# Patient Record
Sex: FEMALE | Race: BLACK OR AFRICAN AMERICAN | NOT HISPANIC OR LATINO | Employment: OTHER | URBAN - METROPOLITAN AREA
[De-identification: names, ages, dates, MRNs, and addresses within clinical notes are randomized per-mention and may not be internally consistent; named-entity substitution may affect disease eponyms.]

---

## 2020-10-23 ENCOUNTER — HISTORICAL (OUTPATIENT)
Dept: ADMINISTRATIVE | Facility: HOSPITAL | Age: 80
End: 2020-10-23

## 2020-10-23 LAB
ALBUMIN SERPL-MCNC: 3.8 GM/DL (ref 3.4–5)
ALBUMIN/GLOB SERPL: 1.4 RATIO (ref 1.1–2)
ALP SERPL-CCNC: 82 UNIT/L (ref 40–150)
ALT SERPL-CCNC: 11 UNIT/L (ref 0–55)
AST SERPL-CCNC: 16 UNIT/L (ref 5–34)
BILIRUB SERPL-MCNC: 0.5 MG/DL
BILIRUBIN DIRECT+TOT PNL SERPL-MCNC: 0.2 MG/DL (ref 0–0.5)
BILIRUBIN DIRECT+TOT PNL SERPL-MCNC: 0.3 MG/DL (ref 0–0.8)
BUN SERPL-MCNC: 6.5 MG/DL (ref 9.8–20.1)
CALCIUM SERPL-MCNC: 9.5 MG/DL (ref 8.4–10.2)
CHLORIDE SERPL-SCNC: 107 MMOL/L (ref 98–107)
CHOLEST SERPL-MCNC: 226 MG/DL
CHOLEST/HDLC SERPL: 5 {RATIO} (ref 0–5)
CO2 SERPL-SCNC: 27 MMOL/L (ref 23–31)
CREAT SERPL-MCNC: 0.76 MG/DL (ref 0.55–1.02)
GLOBULIN SER-MCNC: 2.8 GM/DL (ref 2.4–3.5)
GLUCOSE SERPL-MCNC: 83 MG/DL (ref 82–115)
HDLC SERPL-MCNC: 47 MG/DL (ref 35–60)
LDLC SERPL CALC-MCNC: 159 MG/DL (ref 50–140)
POTASSIUM SERPL-SCNC: 4.2 MMOL/L (ref 3.5–5.1)
PROT SERPL-MCNC: 6.6 GM/DL (ref 5.8–7.6)
SODIUM SERPL-SCNC: 142 MMOL/L (ref 136–145)
TRIGL SERPL-MCNC: 101 MG/DL (ref 37–140)
VLDLC SERPL CALC-MCNC: 20 MG/DL

## 2021-03-05 ENCOUNTER — HISTORICAL (OUTPATIENT)
Dept: ADMINISTRATIVE | Facility: HOSPITAL | Age: 81
End: 2021-03-05

## 2021-03-05 LAB
ALBUMIN SERPL-MCNC: 4 GM/DL (ref 3.4–4.8)
ALBUMIN/GLOB SERPL: 1.3 RATIO (ref 1.1–2)
ALP SERPL-CCNC: 113 UNIT/L (ref 40–150)
ALT SERPL-CCNC: 14 UNIT/L (ref 0–55)
AMYLASE SERPL-CCNC: 89 UNIT/L (ref 20–160)
AST SERPL-CCNC: 19 UNIT/L (ref 5–34)
BILIRUB SERPL-MCNC: 0.5 MG/DL
BILIRUBIN DIRECT+TOT PNL SERPL-MCNC: 0.2 MG/DL (ref 0–0.5)
BILIRUBIN DIRECT+TOT PNL SERPL-MCNC: 0.3 MG/DL (ref 0–0.8)
BUN SERPL-MCNC: 9.3 MG/DL (ref 9.8–20.1)
CALCIUM SERPL-MCNC: 10.1 MG/DL (ref 8.4–10.2)
CHLORIDE SERPL-SCNC: 102 MMOL/L (ref 98–107)
CO2 SERPL-SCNC: 29 MMOL/L (ref 23–31)
CREAT SERPL-MCNC: 0.81 MG/DL (ref 0.55–1.02)
ERYTHROCYTE [DISTWIDTH] IN BLOOD BY AUTOMATED COUNT: 13.2 % (ref 11.5–17)
GLOBULIN SER-MCNC: 3.1 GM/DL (ref 2.4–3.5)
GLUCOSE SERPL-MCNC: 86 MG/DL (ref 82–115)
HCT VFR BLD AUTO: 43.5 % (ref 37–47)
HGB BLD-MCNC: 14 GM/DL (ref 12–16)
LIPASE SERPL-CCNC: 29 U/L
MCH RBC QN AUTO: 30.1 PG (ref 27–31)
MCHC RBC AUTO-ENTMCNC: 32.2 GM/DL (ref 33–36)
MCV RBC AUTO: 93.5 FL (ref 80–94)
PLATELET # BLD AUTO: 345 X10(3)/MCL (ref 130–400)
PMV BLD AUTO: 11.5 FL (ref 9.4–12.4)
POTASSIUM SERPL-SCNC: 4.1 MMOL/L (ref 3.5–5.1)
PROT SERPL-MCNC: 7.1 GM/DL (ref 5.8–7.6)
RBC # BLD AUTO: 4.65 X10(6)/MCL (ref 4.2–5.4)
SODIUM SERPL-SCNC: 139 MMOL/L (ref 136–145)
T3FREE SERPL-MCNC: 3.07 PG/ML (ref 1.71–3.71)
T4 SERPL-MCNC: 9.95 UG/DL (ref 4.87–11.72)
TSH SERPL-ACNC: 2.48 UIU/ML (ref 0.35–4.94)
WBC # SPEC AUTO: 5.4 X10(3)/MCL (ref 4.5–11.5)

## 2023-04-06 ENCOUNTER — ANESTHESIA (OUTPATIENT)
Dept: ENDOSCOPY | Facility: HOSPITAL | Age: 83
End: 2023-04-06
Payer: MEDICARE

## 2023-04-06 ENCOUNTER — ANESTHESIA EVENT (OUTPATIENT)
Dept: ENDOSCOPY | Facility: HOSPITAL | Age: 83
End: 2023-04-06
Payer: MEDICARE

## 2023-04-06 ENCOUNTER — HOSPITAL ENCOUNTER (OUTPATIENT)
Facility: HOSPITAL | Age: 83
Discharge: HOME | End: 2023-04-06
Attending: INTERNAL MEDICINE | Admitting: INTERNAL MEDICINE
Payer: MEDICARE

## 2023-04-06 VITALS
OXYGEN SATURATION: 100 % | SYSTOLIC BLOOD PRESSURE: 126 MMHG | HEIGHT: 60 IN | WEIGHT: 120 LBS | RESPIRATION RATE: 16 BRPM | HEART RATE: 71 BPM | BODY MASS INDEX: 23.56 KG/M2 | TEMPERATURE: 97 F | DIASTOLIC BLOOD PRESSURE: 75 MMHG

## 2023-04-06 DIAGNOSIS — R13.10 DYSPHAGIA, UNSPECIFIED TYPE: ICD-10-CM

## 2023-04-06 PROCEDURE — D9220A PRA ANESTHESIA: ICD-10-PCS | Mod: CRNA,,, | Performed by: NURSE ANESTHETIST, CERTIFIED REGISTERED

## 2023-04-06 PROCEDURE — 37000008 HC ANESTHESIA 1ST 15 MINUTES: Performed by: INTERNAL MEDICINE

## 2023-04-06 PROCEDURE — 25000003 PHARM REV CODE 250: Performed by: ANESTHESIOLOGY

## 2023-04-06 PROCEDURE — 88312 SPECIAL STAINS GROUP 1: CPT

## 2023-04-06 PROCEDURE — 25000003 PHARM REV CODE 250: Performed by: NURSE ANESTHETIST, CERTIFIED REGISTERED

## 2023-04-06 PROCEDURE — 43450 DILATE ESOPHAGUS 1/MULT PASS: CPT | Performed by: INTERNAL MEDICINE

## 2023-04-06 PROCEDURE — D9220A PRA ANESTHESIA: ICD-10-PCS | Mod: ANES,,, | Performed by: ANESTHESIOLOGY

## 2023-04-06 PROCEDURE — 43239 EGD BIOPSY SINGLE/MULTIPLE: CPT | Performed by: INTERNAL MEDICINE

## 2023-04-06 PROCEDURE — 27201423 OPTIME MED/SURG SUP & DEVICES STERILE SUPPLY: Performed by: INTERNAL MEDICINE

## 2023-04-06 PROCEDURE — 63600175 PHARM REV CODE 636 W HCPCS: Performed by: NURSE ANESTHETIST, CERTIFIED REGISTERED

## 2023-04-06 PROCEDURE — 37000009 HC ANESTHESIA EA ADD 15 MINS: Performed by: INTERNAL MEDICINE

## 2023-04-06 PROCEDURE — 88305 TISSUE EXAM BY PATHOLOGIST: CPT | Performed by: INTERNAL MEDICINE

## 2023-04-06 PROCEDURE — 88313 SPECIAL STAINS GROUP 2: CPT

## 2023-04-06 PROCEDURE — D9220A PRA ANESTHESIA: Mod: CRNA,,, | Performed by: NURSE ANESTHETIST, CERTIFIED REGISTERED

## 2023-04-06 PROCEDURE — D9220A PRA ANESTHESIA: Mod: ANES,,, | Performed by: ANESTHESIOLOGY

## 2023-04-06 RX ORDER — SODIUM CHLORIDE, SODIUM GLUCONATE, SODIUM ACETATE, POTASSIUM CHLORIDE AND MAGNESIUM CHLORIDE 30; 37; 368; 526; 502 MG/100ML; MG/100ML; MG/100ML; MG/100ML; MG/100ML
INJECTION, SOLUTION INTRAVENOUS ONCE
Status: COMPLETED | OUTPATIENT
Start: 2023-04-06 | End: 2023-04-06

## 2023-04-06 RX ORDER — LISINOPRIL AND HYDROCHLOROTHIAZIDE 12.5; 2 MG/1; MG/1
1 TABLET ORAL DAILY
COMMUNITY

## 2023-04-06 RX ORDER — LIDOCAINE HYDROCHLORIDE 10 MG/ML
1 INJECTION, SOLUTION EPIDURAL; INFILTRATION; INTRACAUDAL; PERINEURAL ONCE
Status: CANCELLED | OUTPATIENT
Start: 2023-04-06 | End: 2023-04-06

## 2023-04-06 RX ORDER — OMEPRAZOLE 40 MG/1
40 CAPSULE, DELAYED RELEASE ORAL DAILY
COMMUNITY

## 2023-04-06 RX ORDER — PROPOFOL 10 MG/ML
VIAL (ML) INTRAVENOUS
Status: COMPLETED
Start: 2023-04-06 | End: 2023-04-06

## 2023-04-06 RX ORDER — FLUOXETINE 10 MG/1
10 CAPSULE ORAL DAILY
COMMUNITY

## 2023-04-06 RX ORDER — LIDOCAINE HYDROCHLORIDE 10 MG/ML
INJECTION, SOLUTION EPIDURAL; INFILTRATION; INTRACAUDAL; PERINEURAL
Status: DISCONTINUED | OUTPATIENT
Start: 2023-04-06 | End: 2023-04-06

## 2023-04-06 RX ORDER — SODIUM CHLORIDE, SODIUM GLUCONATE, SODIUM ACETATE, POTASSIUM CHLORIDE AND MAGNESIUM CHLORIDE 30; 37; 368; 526; 502 MG/100ML; MG/100ML; MG/100ML; MG/100ML; MG/100ML
INJECTION, SOLUTION INTRAVENOUS CONTINUOUS
Status: CANCELLED | OUTPATIENT
Start: 2023-04-06 | End: 2023-05-06

## 2023-04-06 RX ORDER — DIAZEPAM 5 MG/1
2.5 TABLET ORAL 2 TIMES DAILY
COMMUNITY

## 2023-04-06 RX ORDER — ROSUVASTATIN CALCIUM 20 MG/1
20 TABLET, COATED ORAL DAILY
COMMUNITY

## 2023-04-06 RX ORDER — MEMANTINE HYDROCHLORIDE 10 MG/1
5 TABLET ORAL 2 TIMES DAILY
COMMUNITY

## 2023-04-06 RX ORDER — ASPIRIN 81 MG/1
81 TABLET ORAL DAILY
COMMUNITY
End: 2023-10-03 | Stop reason: ALTCHOICE

## 2023-04-06 RX ORDER — PROPOFOL 10 MG/ML
VIAL (ML) INTRAVENOUS
Status: DISCONTINUED | OUTPATIENT
Start: 2023-04-06 | End: 2023-04-06

## 2023-04-06 RX ORDER — SPIRONOLACTONE 50 MG/1
50 TABLET, FILM COATED ORAL DAILY
COMMUNITY

## 2023-04-06 RX ORDER — LIDOCAINE HYDROCHLORIDE 10 MG/ML
INJECTION, SOLUTION EPIDURAL; INFILTRATION; INTRACAUDAL; PERINEURAL
Status: COMPLETED
Start: 2023-04-06 | End: 2023-04-06

## 2023-04-06 RX ADMIN — PROPOFOL 80 MG: 10 INJECTION, EMULSION INTRAVENOUS at 08:04

## 2023-04-06 RX ADMIN — SODIUM CHLORIDE, SODIUM GLUCONATE, SODIUM ACETATE, POTASSIUM CHLORIDE AND MAGNESIUM CHLORIDE: 526; 502; 368; 37; 30 INJECTION, SOLUTION INTRAVENOUS at 07:04

## 2023-04-06 RX ADMIN — SODIUM CHLORIDE, SODIUM GLUCONATE, SODIUM ACETATE, POTASSIUM CHLORIDE AND MAGNESIUM CHLORIDE: 526; 502; 368; 37; 30 INJECTION, SOLUTION INTRAVENOUS at 08:04

## 2023-04-06 RX ADMIN — PROPOFOL 30 MG: 10 INJECTION, EMULSION INTRAVENOUS at 08:04

## 2023-04-06 RX ADMIN — LIDOCAINE HYDROCHLORIDE 50 MG: 10 INJECTION, SOLUTION EPIDURAL; INFILTRATION; INTRACAUDAL; PERINEURAL at 08:04

## 2023-04-06 NOTE — ANESTHESIA PREPROCEDURE EVALUATION
04/06/2023  Neema Gutierrez is a 83 y.o., female with htn and other medical problems listed in the EMR      Pre-op Assessment    I have reviewed the Patient Summary Reports.     I have reviewed the Nursing Notes. I have reviewed the NPO Status.   I have reviewed the Medications.     Review of Systems      Physical Exam  General: Well nourished and Cooperative    Airway:  Mallampati: II   Mouth Opening: Normal  TM Distance: Normal  Tongue: Normal  Neck ROM: Normal ROM    Chest/Lungs:  Clear to auscultation    Heart:  Rate: Normal        Anesthesia Plan  Type of Anesthesia, risks & benefits discussed:    Anesthesia Type: Gen Natural Airway  Intra-op Monitoring Plan: Standard ASA Monitors  Induction:  IV  Informed Consent: Informed consent signed with the Patient and all parties understand the risks and agree with anesthesia plan.  All questions answered.   ASA Score: 2  Day of Surgery Review of History & Physical: H&P Update referred to the surgeon/provider.    Ready For Surgery From Anesthesia Perspective.     .  I explained anesthesia plan to patient/responsbile party if available.  Anesthesia consent done going over the material facts, risks, complications & alternatives, obtained which includes the possibility of altering the anesthesia plan.  I reviewed problem list, appropriate labs, any workup, Xray, EKG etc noted below.  Patients condition is satisfactory to proceed with anesthesia plan unless otherwise noted (see anesthesia chart for details of the anesthesia plan carried out).      Pre-operative evaluation for Procedure(s) (LRB):  EGD W/ DILATION (N/A)    BP (!) 149/74   Pulse 74   Temp 36.2 °C (97.2 °F)   Resp 15   Ht 5' (1.524 m)   Wt 54.4 kg (120 lb)   SpO2 100%   BMI 23.44 kg/m²     There is no problem list on file for this patient.      Review of patient's allergies indicates:   Allergen  Reactions    Sulfa (sulfonamide antibiotics)        Current Outpatient Medications   Medication Instructions    aspirin (ECOTRIN) 81 mg, Oral, Daily    diazePAM (VALIUM) 2.5 mg, 2 times daily    FLUoxetine 10 mg, Oral, Daily    lisinopriL-hydrochlorothiazide (PRINZIDE,ZESTORETIC) 20-12.5 mg per tablet 1 tablet, Oral, Daily    memantine (NAMENDA) 5 mg, Oral, 2 times daily    omeprazole (PRILOSEC) 40 mg, Oral, Daily    rosuvastatin (CRESTOR) 20 mg, Oral, Daily    spironolactone (ALDACTONE) 50 mg, Oral, Daily       Past Surgical History:   Procedure Laterality Date    COLON SURGERY      HYSTERECTOMY         Social History     Socioeconomic History    Marital status: Unknown   Tobacco Use    Smoking status: Never    Smokeless tobacco: Never   Substance and Sexual Activity    Alcohol use: Yes     Comment: occasional    Drug use: Never            INR  No results for input(s): PT, INR, PROTIME, APTT in the last 72 hours.        Diagnostic Studies:      EKG:  No results found for this or any previous visit.

## 2023-04-06 NOTE — H&P
Gastroenterology         HPI 83 y.o. female who has solid food dysphagia low with her pills.  She has no problems with what appears been going on for quite some time we usually stretcher and she has good results.  She says it has been happening with every meal.  Since she swallows she feels like it is had difficulty going down on the left side.  She denies any significant weight loss or other alarm symptoms.  Plan for EGD today with dilated    Past Medical History:   Diagnosis Date    Hypertension        Past Surgical History:   Procedure Laterality Date    COLON SURGERY      HYSTERECTOMY         Social History     Tobacco Use    Smoking status: Never    Smokeless tobacco: Never   Substance Use Topics    Alcohol use: Yes     Comment: occasional    Drug use: Never       History reviewed. No pertinent family history.    Review of Systems  General ROS: negative for - chills, fever or weight loss  Psychological ROS: negative for - hallucination, depression or suicidal ideation  Ophthalmic ROS: negative for - blurry vision, photophobia or eye pain  ENT ROS: negative for - epistaxis, sore throat or rhinorrhea  Respiratory ROS: no cough, shortness of breath, or wheezing  Cardiovascular ROS: no chest pain or dyspnea on exertion  Gastrointestinal ROS:negative for bleeding  Genito-Urinary ROS: no dysuria, trouble voiding, or hematuria  Musculoskeletal ROS: negative for - gait disturbance or muscular weakness  Neurological ROS: no syncope or seizures; no ataxia  Dermatological ROS: negative for pruritis, rash and jaundice    Physical Examination  Ht 5' (1.524 m)   Wt 54.4 kg (120 lb)   BMI 23.44 kg/m²   General appearance: alert, cooperative, no distress  HENT: Normocephalic, atraumatic, neck symmetrical, no nasal discharge   Eyes: conjunctivae/corneas clear, PERRL, EOM's intact  Lungs: clear to auscultation bilaterally, no dullness to percussion bilaterally  Heart: regular rate and rhythm without rub; no displacement of the  PMI   Abdomen: benign  Extremities: extremities symmetric; no clubbing, cyanosis, or edema  Integument: Skin color, texture, turgor normal; no rashes; hair distrubution normal  Neurologic: Alert and oriented X 3, normal strength, normal coordination and gait  Psychiatric: no pressured speech; normal affect; no evidence of impaired cognition       Assessment:   egd

## 2023-04-06 NOTE — PROVATION PATIENT INSTRUCTIONS
Discharge Summary/Instructions after an Endoscopic Procedure  Patient Name: Neema Gutierrez  Patient MRN: [id number]  Patient YOB: 1940 Thursday, April 6, 2023  Chavo Knowles MD  Dear patient,  As a result of recent federal legislation (The Federal Cures Act), you may   receive lab or pathology results from your procedure in your [facility]   account before your physician is able to contact you. Your physician or   their representative will relay the results to you with their   recommendations at their soonest availability.  Thank you,  RESTRICTIONS:  During your procedure today, you received medications for sedation.  These   medications may affect your judgment, balance and coordination.  Therefore,   for 24 hours, you have the following restrictions:   - DO NOT drive a car, operate machinery, make legal/financial decisions,   sign important papers or drink alcohol.    ACTIVITY:  Today: no heavy lifting, straining or running due to procedural   sedation/anesthesia.  The following day: return to full activity including work.  DIET:  Eat and drink normally unless instructed otherwise.     TREATMENT FOR COMMON SIDE EFFECTS:  - Mild abdominal pain, nausea, belching, bloating or excessive gas:  rest,   eat lightly and use a heating pad.  - Sore Throat: treat with throat lozenges and/or gargle with warm salt   water.  - Because air was used during the procedure, expelling large amounts of air   from your rectum or belching is normal.  - If a bowel prep was taken, you may not have a bowel movement for 1-3 days.    This is normal.  SYMPTOMS TO WATCH FOR AND REPORT TO YOUR PHYSICIAN:  1. Abdominal pain or bloating, other than gas cramps.  2. Chest pain.  3. Back pain.  4. Signs of infection such as: chills or fever occurring within 24 hours   after the procedure.  5. Rectal bleeding, which would show as bright red, maroon, or black stools.   (A tablespoon of blood from the rectum is not serious, especially  if   hemorrhoids are present.)  6. Vomiting.  7. Weakness or dizziness.  GO DIRECTLY TO THE NEAREST EMERGENCY ROOM IF YOU HAVE ANY OF THE FOLLOWING:      Difficulty breathing              Chills and/or fever over 101 F   Persistent vomiting and/or vomiting blood   Severe abdominal pain   Severe chest pain   Black, tarry stools   Bleeding- more than one tablespoon   Any other symptom or condition that you feel may need urgent attention  Your doctor recommends these additional instructions:  If any biopsies were taken, your doctors clinic will contact you in 1 to 2   weeks with any results.  - Will see how she responds to dilation, if no improvement consider   manometry study and speech therapy evaluation  - Follow up pathology  - Discharge patient to home.   - Resume regular diet.   - Return to my office in 6 months.  For questions, problems or results please call your physician - Chavo Knowles MD at Work:  [phone].  [facility] EMERGENCY ROOM PHONE NUMBER: [phone]  IF A COMPLICATION OR EMERGENCY SITUATION ARISES AND YOU ARE UNABLE TO REACH   YOUR PHYSICIAN - GO DIRECTLY TO THE EMERGENCY ROOM.  MD Chavo Simental MD  4/6/2023 8:46:43 AM  This report has been verified and signed electronically.  Dear patient,  As a result of recent federal legislation (The Federal Cures Act), you may   receive lab or pathology results from your procedure in your [facility]   account before your physician is able to contact you. Your physician or   their representative will relay the results to you with their   recommendations at their soonest availability.  Thank you,  PROVATION

## 2023-04-06 NOTE — ANESTHESIA POSTPROCEDURE EVALUATION
Anesthesia Post Evaluation    Patient: Neema Gutierrez    Procedure(s) Performed: Procedure(s) (LRB):  EGD  (N/A)  ESOPHAGOSCOPY, USING BOUGIE, WITH DILATION    Final Anesthesia Type: general (/Regional//MAC)      Patient location during evaluation: PACU  Post-procedure mental status: @ basline.  Post-procedure vital signs: reviewed and stable  Pain management: adequate    PONV status: See postop meds for drugs used to control n/v if any.  Anesthetic complications: no      Cardiovascular status: blood pressure returned to baseline  Respiratory status: @ baseline.  Hydration status: euvolemic            Vitals Value Taken Time   /75 04/06/23 0902   Temp 36 °C (96.8 °F) 04/06/23 0844   Pulse 71 04/06/23 0902   Resp 16 04/06/23 0902   SpO2 100 % 04/06/23 0902         No case tracking events are documented in the log.      Pain/Yesenia Score: Yesenia Score: 10 (4/6/2023  9:02 AM)

## 2023-04-06 NOTE — TRANSFER OF CARE
Anesthesia Transfer of Care Note    Patient: Neema Gutierrez    Procedure(s) Performed: Procedure(s) (LRB):  EGD  (N/A)  ESOPHAGOSCOPY, USING BOUGIE, WITH DILATION    Patient location: GI    Anesthesia Type: general    Transport from OR: Transported from OR on room air with adequate spontaneous ventilation    Post pain: adequate analgesia    Post assessment: no apparent anesthetic complications    Post vital signs: stable    Level of consciousness: sedated    Nausea/Vomiting: no nausea/vomiting    Complications: none    Transfer of care protocol was followed      Last vitals:   Visit Vitals  BP (!) 95/56   Pulse 75   Temp 36 °C (96.8 °F)   Resp 19   Ht 5' (1.524 m)   Wt 54.4 kg (120 lb)   SpO2 100%   BMI 23.44 kg/m²

## 2023-04-10 LAB — PSYCHE PATHOLOGY RESULT: NORMAL

## 2023-07-17 DIAGNOSIS — M16.12 PRIMARY OSTEOARTHRITIS OF LEFT HIP: Primary | ICD-10-CM

## 2023-08-09 ENCOUNTER — OFFICE VISIT (OUTPATIENT)
Dept: ORTHOPEDICS | Facility: CLINIC | Age: 83
End: 2023-08-09
Payer: MEDICARE

## 2023-08-09 VITALS
DIASTOLIC BLOOD PRESSURE: 74 MMHG | HEART RATE: 76 BPM | WEIGHT: 120 LBS | HEIGHT: 60 IN | BODY MASS INDEX: 23.56 KG/M2 | TEMPERATURE: 98 F | SYSTOLIC BLOOD PRESSURE: 125 MMHG

## 2023-08-09 DIAGNOSIS — M25.552 LEFT HIP PAIN: Primary | ICD-10-CM

## 2023-08-09 DIAGNOSIS — M54.16 LUMBAR RADICULOPATHY: ICD-10-CM

## 2023-08-09 DIAGNOSIS — M16.12 PRIMARY OSTEOARTHRITIS OF LEFT HIP: ICD-10-CM

## 2023-08-09 PROCEDURE — 99204 PR OFFICE/OUTPT VISIT, NEW, LEVL IV, 45-59 MIN: ICD-10-PCS | Mod: ,,, | Performed by: ORTHOPAEDIC SURGERY

## 2023-08-09 PROCEDURE — 99204 OFFICE O/P NEW MOD 45 MIN: CPT | Mod: ,,, | Performed by: ORTHOPAEDIC SURGERY

## 2023-08-09 NOTE — PROGRESS NOTES
Subjective:    CC: Pain of the Left Hip (Pain in the hip on the lateral side that goes down the left leg going on for the last 5 months.  No accident or injury. Doing therapy at home which is helping. Taking OTC that helps.)       HPI:  Patient comes in today for her 1st visit.  Patient complains of left hip pain.  Patient states it goes down from her buttock area down into her ankle region.  She states it is gone on for the last 5 months.  She states it got worse twice after being in the hospital, being in bed too long, for a few days.  She is presently with family.  She denies any groin pain.  She has seen by her PCP as an outside clinic, referred to sciatica.    ROS: Refer to HPI for pertinent ROS. All other 12 point systems negative.    Objective:  Vitals:    08/09/23 1321   BP: 125/74   BP Location: Right arm   Patient Position: Sitting   BP Method: Medium (Automatic)   Pulse: 76   Temp: 97.5 °F (36.4 °C)   Weight: 54.4 kg (120 lb)   Height: 5' (1.524 m)        Physical Exam:  Patient is well-nourished developed female she is awake alert and oriented x3 she is in no apparent stress is pleasant and cooperative.  Examination of the left lower extremity compartment soft and warm.  Skin is intact.  There is no signs symptoms of DVT or infection.  She has no groin pain negative Stinchfield exam stable to stressing.  Questionable straight leg raise.  She is tender about the paraspinal muscles more on the left side, she has some tenderness in her buttock area, she is nontender over her trochanteric area, she is no pain with resisted hip abduction.  She is neurovascular intact distally.    Images:  X-rays two views left hip demonstrates degenerative changes. Images Reviewed and discussed with patient.    Assessment:  1. Left hip pain  - X-Ray Hip 2 or 3 views Left (with Pelvis when performed); Future    2. Primary osteoarthritis of left hip  - Ambulatory referral/consult to Orthopedics    3. Lumbar radiculopathy         Plan:  At this time we discussed her physical exam and x-ray findings.  Patient does have underlying arthritis of the left hip though she is symptomatic about her lower back.  She describes lumbar radiculopathy.  It has been intermittent for several months, she has gotten some relief with physical therapy, she will continue with this.  She will continue with her cane.  I would like see her to see a spine specialist for possible lumbar injection, we will set this up at her convenience.    Follow UP: No follow-ups on file.

## 2023-08-21 NOTE — PROGRESS NOTES
Subjective:      Patient ID: Neema Gutierrez is a 83 y.o. female.    Chief Complaint: Back Pain (Lower back pain that goes down the leg. No numbness or tingling. Taking OTC which does not help. X-rays of lumbar spine. Having the pain for the last 6 months.  No injury accident use a walker to ambulate. )    Referred by: No ref. provider found     HPI:  This is a pleasant 83-year-old female patient presenting as a new consult from orthopedic surgeon, Dr. Too Denson for lumbar radiculopathy.  Dr. Denson noted patient has underlying arthritis of her left hip though she is symptomatic with her lower back pain.  She describes lumbar radiculopathy that has been intermittent for several months.  She has left hip pain radiating to her left buttock down to her left ankle.  She denies groin pain.  She has received some relief with physical therapy and continues this.  She walks with a single prong cane and rollator as needed.  Pain increased with walking and prolonged standing.  He also has limited ability to perform household chores.  Currently she is unable to cook a full meal as she can not stand that long.  These activities will elevate her pain to 8-10/10.  Her current pain score today as 8/10.  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  Her pain started spontaneously 2-3 years ago.  She has completed physical therapy for 3 weeks in her home.  States this has reduced her pain  at 20%.  She is not taking any pain medications and unfortunately states she has to sleep a lot of the time to reduce her pain.  She does not have an MRI of her lumbar spine.  She is currently not taking any pain        Vital signs:   Vitals:    23 0905   BP: (!) 82/58   Pulse: 93   Temp: 98.2 °F (36.8 °C)   Weight: 54.4 kg (120 lb)   Height: 5' (1.524 m)   PainSc:   8     Body mass index is 23.44 kg/m².  Pain Disability Index (PDI): 32       Interventional Pain  History  None    ROS:  Left hip, low back and left leg pain    X-ray lumbar spine 07/2023:   Impression  Stable lumbar spondylosis and facet disease. No acute fracture or dislocation.   Narrative    Findings: The pedicles are intact. The sacrum is normal. Vertebral body heights are well preserved. Multilevel spondylosis and facet disease is advanced. Alignment is stable. The gallbladder has been removed. Stool burden is moderate.           Objective:          Physical Exam  General: Well developed; underweight, A&O x 3; No anxiety/depression; NAD  Mental Status: Oriented to person, palce and time. Displays appropriate mood & affect.  Head: Norm cephalic and atraumatic  Neck:  No cervical paraspinal banding.  Full range of motion with lateral turning and cervical flexion +extension.  Eyes: normal conjunctiva, normal lids, normal pupils  ENT and mouth: normal external ear, nose, and no lesions noted on the lips.  Respiratory: Symmetrical, Unlabored. No dyspnea  CV: normal rhythm and rate. No peripheral edema.   Abdomen: Non-distended    Extremities:  Gen: No cyanosis or tenderness to palpation bilateral upper and lower extremities  Skin: Warm, pink, dry, no rashes, no lesions on the lumbar spine  Strength: 5/5 motor strength bilateral upper and lower extremities  ROM: Full ROM in bilateral knees and ankles without pain or instability.    Neuro:  Gait: no altalgic lean, normal toe and heel raise. Ambulates with rollator.   DTR's: 2+ in bilateral patellar, and ankle  Sensory: Intact to light touch bilateral  upper and lower extremities    Spine: Normal lordosis. No scoliosis  L-spine ROM: Limited ROM and pain with flexion, extension, bilateral rotation,   Straight Leg Raise:  Positive left with ambulation  SI Joint: No tenderness to palpation bilaterally.               Assessment:     A/P:Patient will continue home health PT for 3 weeks and return to clinic for left sciatica after this.    Medrol dose pack Rx  given  Consider MRI L spine if PT ineffective.  Suspect disc bulge or abnormality to left L5.  X-ray lumbar spine shows stable lumbar spondylosis and facet disease.          Encounter Diagnosis   Name Primary?    Lumbar radiculopathy Yes         Plan:       Neema was seen today for back pain.    Diagnoses and all orders for this visit:    Lumbar radiculopathy               Past Medical History:   Diagnosis Date    Anxiety     Arthritis     GERD (gastroesophageal reflux disease)     High cholesterol     Hypertension        Past Surgical History:   Procedure Laterality Date    COLON SURGERY      diverticulitis    EGD, WITH CLOSED BIOPSY N/A 04/06/2023    Procedure: EGD ;  Surgeon: Chavo Knowles MD;  Location: [facility] ENDOSCOPY;  Service: Gastroenterology;  Laterality: N/A;    ESOPHAGOSCOPY, USING BOUGIE, WITH DILATION  04/06/2023    Procedure: ESOPHAGOSCOPY, USING BOUGIE, WITH DILATION;  Surgeon: Chavo Knowles MD;  Location: [facility] ENDOSCOPY;  Service: Gastroenterology;;  55[address] [place]    HEMORRHOID SURGERY      HYSTERECTOMY         Family History   Family history unknown: Yes       Social History     Socioeconomic History    Marital status: Unknown   Tobacco Use    Smoking status: Never    Smokeless tobacco: Never   Substance and Sexual Activity    Alcohol use: Yes     Comment: occasional    Drug use: Never    Sexual activity: Not Currently       Current Outpatient Medications   Medication Sig Dispense Refill    aspirin (ECOTRIN) 81 MG EC tablet Take 81 mg by mouth once daily.      budesonide (ENTOCORT EC) 3 mg capsule Take 9 mg by mouth every morning.      diazePAM (VALIUM) 5 MG tablet 2.5 mg 2 (two) times a day.      FLUoxetine 10 MG capsule Take 10 mg by mouth once daily.      LINZESS 145 mcg Cap capsule Take 145 mcg by mouth every morning.      lisinopriL-hydrochlorothiazide (PRINZIDE,ZESTORETIC) 20-12.5 mg per tablet Take 1 tablet by mouth once daily.      memantine (NAMENDA) 10 MG Tab  Take 5 mg by mouth 2 (two) times daily.      omeprazole (PRILOSEC) 40 MG capsule Take 40 mg by mouth once daily.      rosuvastatin (CRESTOR) 20 MG tablet Take 20 mg by mouth once daily.      spironolactone (ALDACTONE) 50 MG tablet Take 50 mg by mouth once daily.       No current facility-administered medications for this visit.       Review of patient's allergies indicates:   Allergen Reactions    Sulfa (sulfonamide antibiotics)

## 2023-08-22 ENCOUNTER — OFFICE VISIT (OUTPATIENT)
Dept: PAIN MEDICINE | Facility: CLINIC | Age: 83
End: 2023-08-22
Payer: MEDICARE

## 2023-08-22 VITALS
BODY MASS INDEX: 23.56 KG/M2 | WEIGHT: 120 LBS | TEMPERATURE: 98 F | HEART RATE: 93 BPM | DIASTOLIC BLOOD PRESSURE: 58 MMHG | HEIGHT: 60 IN | SYSTOLIC BLOOD PRESSURE: 82 MMHG

## 2023-08-22 DIAGNOSIS — M54.32 LEFT SIDED SCIATICA: ICD-10-CM

## 2023-08-22 DIAGNOSIS — M54.16 LUMBAR RADICULOPATHY: Primary | ICD-10-CM

## 2023-08-22 PROCEDURE — 99203 OFFICE O/P NEW LOW 30 MIN: CPT | Mod: ,,, | Performed by: NURSE PRACTITIONER

## 2023-08-22 PROCEDURE — 99203 PR OFFICE/OUTPT VISIT, NEW, LEVL III, 30-44 MIN: ICD-10-PCS | Mod: ,,, | Performed by: NURSE PRACTITIONER

## 2023-08-22 RX ORDER — LINACLOTIDE 145 UG/1
145 CAPSULE, GELATIN COATED ORAL EVERY MORNING
COMMUNITY
Start: 2023-07-31

## 2023-08-22 RX ORDER — BUDESONIDE 3 MG/1
9 CAPSULE, COATED PELLETS ORAL EVERY MORNING
COMMUNITY
Start: 2023-07-07 | End: 2023-08-22 | Stop reason: ALTCHOICE

## 2023-08-22 RX ORDER — METHYLPREDNISOLONE 4 MG/1
TABLET ORAL
Qty: 21 EACH | Refills: 0 | Status: SHIPPED | OUTPATIENT
Start: 2023-08-22 | End: 2023-09-12

## 2023-09-12 ENCOUNTER — OFFICE VISIT (OUTPATIENT)
Dept: PAIN MEDICINE | Facility: CLINIC | Age: 83
End: 2023-09-12
Payer: MEDICARE

## 2023-09-12 VITALS
HEART RATE: 70 BPM | SYSTOLIC BLOOD PRESSURE: 116 MMHG | DIASTOLIC BLOOD PRESSURE: 68 MMHG | WEIGHT: 111 LBS | HEIGHT: 60 IN | BODY MASS INDEX: 21.79 KG/M2

## 2023-09-12 DIAGNOSIS — M54.16 LUMBAR RADICULOPATHY, CHRONIC: ICD-10-CM

## 2023-09-12 DIAGNOSIS — M47.816 LUMBAR SPONDYLOSIS: ICD-10-CM

## 2023-09-12 DIAGNOSIS — M54.9 DORSALGIA, UNSPECIFIED: ICD-10-CM

## 2023-09-12 DIAGNOSIS — M54.16 LUMBAR RADICULOPATHY: Primary | ICD-10-CM

## 2023-09-12 PROCEDURE — 99214 OFFICE O/P EST MOD 30 MIN: CPT | Mod: ,,, | Performed by: NURSE PRACTITIONER

## 2023-09-12 PROCEDURE — 99214 PR OFFICE/OUTPT VISIT, EST, LEVL IV, 30-39 MIN: ICD-10-PCS | Mod: ,,, | Performed by: NURSE PRACTITIONER

## 2023-09-12 NOTE — PROGRESS NOTES
Subjective:      Patient ID: Neema Gutierrez is a 83 y.o. female.    Chief Complaint: Low-back Pain (L sided sciatica pain - pt went to physical therapy - she states that therapy is helping, but she is still having pain. She is ambulating with a cane today. Currently taking aleve for pain with some relief. )    Referred by: No ref. provider found     HPI:  This is a pleasant 83-year-old female patient presenting as a follow-up for left-sided sciatica after completing 3 weeks of physical therapy.    She describes lumbar radiculopathy that has been intermittent for several months.  She has left hip pain radiating to her left buttock down laterally to her left ankle.  She denies groin pain.  She has received some relief with physical therapy and continues this.  She walks with a single prong cane and rollator as needed.  Pain increased with walking and prolonged standing.  He also has limited ability to perform household chores.  Currently she is unable to cook a full meal as she can not stand that long.  These activities will elevate her pain to 8-10/10.  Her current pain score today as 8/10.  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  Her pain started spontaneously 2-3 years ago.  She has completed physical therapy for 3 weeks in her home.  States this has reduced her pain by 20%.  She is not taking any pain medications and unfortunately states she has to sleep a lot of the time to reduce her pain.  She does not have an MRI of her lumbar spine.  She is currently not taking any pain medications.            Vital signs:   Vitals:    09/12/23 1445   BP: 116/68   Pulse: 70   Weight: 50.3 kg (111 lb)   Height: 5' (1.524 m)     Body mass index is 21.68 kg/m².  Pain Disability Index (PDI): 32       Interventional Pain History      ROS: Left hip normal low back and left leg pain.    X-ray lumbar spine 07/2023:  Impression  Stable lumbar spondylosis and facet  disease. No acute fracture or dislocation.   Narrative    Findings: The pedicles are intact. The sacrum is normal. Vertebral body heights are well preserved. Multilevel spondylosis and facet disease is advanced. Alignment is stable. The gallbladder has been removed. Stool burden is moderate        Objective:          Physical Exam  General: Well developed; underweight, A&O x 3; No anxiety/depression; NAD  Mental Status: Oriented to person, palce and time. Displays appropriate mood & affect.  Head: Norm cephalic and atraumatic  Neck:  No cervical paraspinal banding.  Full range of motion with lateral turning and cervical flexion +extension.  Eyes: normal conjunctiva, normal lids, normal pupils  ENT and mouth: normal external ear, nose, and no lesions noted on the lips.  Respiratory: Symmetrical, Unlabored. No dyspnea  CV: normal rhythm and rate. No peripheral edema.   Abdomen: Non-distended     Extremities:  Gen: No cyanosis or tenderness to palpation bilateral upper and lower extremities  Skin: Warm, pink, dry, no rashes, no lesions on the lumbar spine  Strength: 5/5 motor strength bilateral upper and lower extremities  ROM: Full ROM in bilateral knees and ankles without pain or instability.     Neuro:  Gait: no altalgic lean, normal toe and heel raise. Ambulates with rollator.   DTR's: 2+ in bilateral patellar, and ankle  Sensory: Intact to light touch bilateral  upper and lower extremities     Spine: Normal lordosis. No scoliosis  L-spine ROM: Limited ROM and pain with flexion, extension, bilateral rotation,   Straight Leg Raise:  Positive left with ambulation  SI Joint: No tenderness to palpation bilaterally.      Assessment:     A/P:MRI lumbar spine ordered however, there was an order already placed for lumbar MR spine scheduled on 09/19/2023.  This was noticed after patient left visit.  Request sent to staff to relayed to her I am not able to order a new 1 she will need to obtain the lumbar MRI on 09/19/2023 f  "scheduled from the prior physician or nurse practitioner/PA who ordered this.Thus, my order was discontinued as it was a duplicate.       Request sent for home health "[employer] 2-3 times a week for 8 weeks. Patient to follow up in 3 weeks to go over MRI L spine finding  . Suspect L5 disc bulge and possible foraminal narrowing.       Encounter Diagnoses   Name Primary?    Lumbar radiculopathy Yes    Lumbar spondylosis          Plan:       Neema was seen today for low-back pain.    Diagnoses and all orders for this visit:    Lumbar radiculopathy    Lumbar spondylosis               Past Medical History:   Diagnosis Date    Anxiety     Arthritis     GERD (gastroesophageal reflux disease)     High cholesterol     Hypertension        Past Surgical History:   Procedure Laterality Date    COLON SURGERY      diverticulitis    EGD, WITH CLOSED BIOPSY N/A 04/06/2023    Procedure: EGD ;  Surgeon: Chavo Knowles MD;  Location: [facility] ENDOSCOPY;  Service: Gastroenterology;  Laterality: N/A;    ESOPHAGOSCOPY, USING BOUGIE, WITH DILATION  04/06/2023    Procedure: ESOPHAGOSCOPY, USING BOUGIE, WITH DILATION;  Surgeon: Chavo Knowles MD;  Location: [facility] ENDOSCOPY;  Service: Gastroenterology;;  55[address] [place]    HEMORRHOID SURGERY      HYSTERECTOMY         Family History   Family history unknown: Yes       Social History     Socioeconomic History    Marital status: Unknown   Tobacco Use    Smoking status: Never    Smokeless tobacco: Never   Substance and Sexual Activity    Alcohol use: Yes     Comment: occasional    Drug use: Never    Sexual activity: Not Currently       Current Outpatient Medications   Medication Sig Dispense Refill    aspirin (ECOTRIN) 81 MG EC tablet Take 81 mg by mouth once daily.      diazePAM (VALIUM) 5 MG tablet 2.5 mg 2 (two) times a day.      FLUoxetine 10 MG capsule Take 10 mg by mouth once daily.      LINZESS 145 mcg Cap capsule Take 145 mcg by mouth every morning.      " lisinopriL-hydrochlorothiazide (PRINZIDE,ZESTORETIC) 20-12.5 mg per tablet Take 1 tablet by mouth once daily.      memantine (NAMENDA) 10 MG Tab Take 5 mg by mouth 2 (two) times daily.      methylPREDNISolone (MEDROL DOSEPACK) 4 mg tablet use as directed 21 each 0    omeprazole (PRILOSEC) 40 MG capsule Take 40 mg by mouth once daily.      rosuvastatin (CRESTOR) 20 MG tablet Take 20 mg by mouth once daily.      spironolactone (ALDACTONE) 50 MG tablet Take 50 mg by mouth once daily.       No current facility-administered medications for this visit.       Review of patient's allergies indicates:   Allergen Reactions    Sulfa (sulfonamide antibiotics)

## 2023-09-19 ENCOUNTER — HOSPITAL ENCOUNTER (OUTPATIENT)
Dept: RADIOLOGY | Facility: HOSPITAL | Age: 83
Discharge: HOME | End: 2023-09-19
Attending: NURSE PRACTITIONER
Payer: MEDICARE

## 2023-09-19 DIAGNOSIS — M54.16 LUMBAR RADICULOPATHY: ICD-10-CM

## 2023-09-19 PROCEDURE — 72148 MRI LUMBAR SPINE W/O DYE: CPT | Mod: TC

## 2023-10-03 ENCOUNTER — OFFICE VISIT (OUTPATIENT)
Dept: PAIN MEDICINE | Facility: CLINIC | Age: 83
End: 2023-10-03
Payer: MEDICARE

## 2023-10-03 VITALS
HEIGHT: 60 IN | WEIGHT: 112 LBS | HEART RATE: 81 BPM | DIASTOLIC BLOOD PRESSURE: 66 MMHG | SYSTOLIC BLOOD PRESSURE: 110 MMHG | BODY MASS INDEX: 21.99 KG/M2

## 2023-10-03 DIAGNOSIS — M47.816 LUMBAR SPONDYLOSIS: ICD-10-CM

## 2023-10-03 DIAGNOSIS — M54.16 LUMBAR RADICULOPATHY: Primary | ICD-10-CM

## 2023-10-03 PROCEDURE — 99214 OFFICE O/P EST MOD 30 MIN: CPT | Mod: ,,, | Performed by: NURSE PRACTITIONER

## 2023-10-03 PROCEDURE — 99214 PR OFFICE/OUTPT VISIT, EST, LEVL IV, 30-39 MIN: ICD-10-PCS | Mod: ,,, | Performed by: NURSE PRACTITIONER

## 2023-10-03 RX ORDER — NAPROXEN SODIUM 220 MG/1
81 TABLET, FILM COATED ORAL
COMMUNITY

## 2023-10-03 RX ORDER — GABAPENTIN 100 MG/1
100 CAPSULE ORAL 3 TIMES DAILY
Qty: 90 CAPSULE | Refills: 2 | Status: SHIPPED | OUTPATIENT
Start: 2023-10-03 | End: 2023-11-15 | Stop reason: CLARIF

## 2023-10-03 NOTE — PROGRESS NOTES
Subjective:      Patient ID: Neema Gutierrez is a 83 y.o. female.    Chief Complaint: Low-back Pain and Results (3 weeks f/u MRI L spine, pt ambulating with a walker, C/O pain level sitting 8, nightime 10.not taking pain meds.)    Referred by: No ref. provider found     HPI:  This is a pleasant 83-year-old  female patient presenting as a follow-up for left-sided sciatica after completing a lumbar MRI and to discuss findings and plan of care.  Additionally, I ordered home health from [employer] 2 to 3 times a week for 8 weeks since her last visit. Home health PT helps pain in the morning. Pain flares in the afternoon. PT home exercises for the most part has been ineffective.     She describes lumbar radiculopathy that has been intermittent for several months.  She has left hip pain radiating to her left buttock down laterally to her left ankle.  She denies groin pain.  She has received some relief with physical therapy and continues this.  She walks with a single prong cane and rollator as needed.  Pain increased with walking and prolonged standing.  He also has limited ability to perform household chores.  Currently she is unable to cook a full meal as she can not stand that long.  These activities will elevate her pain to 8-10/10.  Her current pain score today as 8/10.  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  Her pain started spontaneously 2-3 years ago.  She is not taking any pain medications and unfortunately states she has to sleep a lot of the time to reduce her pain. She is currently not taking any pain medications.    MRI lumbar spine shows mild disc bulging L4-L5 and L5-L1.             Vital signs:   Vitals:    10/03/23 1426 10/03/23 1429   BP: 110/66    Pulse: 81    Weight: 50.8 kg (112 lb)    Height: 5' (1.524 m)    PainSc:    8     Body mass index is 21.87 kg/m².  Pain Disability Index (PDI): 47       Interventional Pain  History  None     None   ROS left hip and leg pain      Lumbar MRI 2023:  FINDINGS:  For the purpose of this report, the most inferior well developed intervertebral disc space is presumed to represent L5-S1. Lumbar vertebrae stature is unremarkable and alignment is preserved.  There are no acute marrow edematous signals.  Lumbar discs are desiccated throughout.  DS signals of the endplates are mostly at L4-L5.  The visualized thoracic cord is unremarkable. The conus medullaris terminates at L1.  Disc segmental analysis is given below     At L1-L2, there is no disc herniation.  There is mild central canal stenosis caused by facet arthropathy.  Bilateral neural foramen are unremarkable.     At L2-L3, there is bulging of annulus fibrosis which slightly indents the ventral thecal sac.  There is moderate to marked central canal stenosis caused by facet arthropathy which is more pronounced left.  There is also ligamentum flavum thickening.  Right neural foramen is patent.  Moderate narrowing of left neural foramen is caused by facet arthropathy.     At L3-L4, there is disc bulge which compresses and flattens the ventral thecal sac.  Central canal stenosis is marked and mostly is caused by facet arthrosis and hypertrophy.  There is also thickening of the ligamentum flavum.  There are no significant narrowings of the neural foramen.     At L4-L5, there is generalized disc bulge with central extruded portion which migrates posterior to L4 vertebral body.  Disc causes substantial compression upon the ventral thecal sac.  There is severe central canal stenosis which also is caused by facets arthropathy.  There are bilateral moderate spondylotic narrowings of the neural foramen.     At L5-S1, there is bulging of annulus fibrosis which indents the ventral thecal sac.  Bilateral facet arthropathy.  Central canal is not stenosed.  There is moderate spondylotic narrowing of the right neural foramen caused by facet arthropathy.  The  left neural foramen is adequate.     Impression:     Lumbar degenerative disc disease and spondylosis level by level discussed above.          Objective:          Physical Exam  General: Well developed; underweight, A&O x 3; No anxiety/depression; NAD  Mental Status: Oriented to person, palce and time. Displays appropriate mood & affect.  Head: Norm cephalic and atraumatic  Neck:  No cervical paraspinal banding.  Full range of motion with lateral turning and cervical flexion +extension.  Eyes: normal conjunctiva, normal lids, normal pupils  ENT and mouth: normal external ear, nose, and no lesions noted on the lips.  Respiratory: Symmetrical, Unlabored. No dyspnea  CV: normal rhythm and rate. No peripheral edema.   Abdomen: Non-distended     Extremities:  Gen: No cyanosis or tenderness to palpation bilateral upper and lower extremities  Skin: Warm, pink, dry, no rashes, no lesions on the lumbar spine  Strength: 5/5 motor strength bilateral upper and lower extremities  ROM: Full ROM in bilateral knees and ankles without pain or instability.     Neuro:  Gait: no altalgic lean, normal toe and heel raise. Ambulates with rollator.   DTR's: 2+ in bilateral patellar, and ankle  Sensory: Intact to light touch bilateral  upper and lower extremities     Spine: Normal lordosis. No scoliosis  L-spine ROM: Limited ROM and pain with flexion, extension, bilateral rotation,   Straight Leg Raise:  Positive left with ambulation  SI Joint: No tenderness to palpation bilaterally.             Assessment:     A/p:  Patient did not receive any pain control with left-sided sciatica that is radiating down her left lateral leg.  Home health physical therapy was ineffective for her pain down the left leg.    MRI Lumbar spine shows the following:  L4-L5, there is generalized disc bulge with central extruded portion which migrates posterior to L4 vertebral body.  Disc causes substantial compression upon the ventral thecal sac.  There is severe  central canal stenosis which also is caused by facets arthropathy.  There are bilateral moderate spondylotic narrowings of the neural foramen.     At L5-S1, there is bulging of annulus fibrosis which indents the ventral thecal sac.  Bilateral facet arthropathy.  Central canal is not stenosed.  There is moderate spondylotic narrowing of the right neural foramen caused by facet arthropathy.  The left neural foramen is adequate.    Patient is not interested in a lumbar THELMA.   She is open to Gabapentin 100 mg TID. This was ordered to pharmacy on file.   Patient was instructed to start taking medication twice daily to begin with and see how this reduces her pain     Follow up with me in 6 weeks to evaluate left sciatica after starting Gapapentin.     Patient called back requesting physical therapy order placed to [facility]  in [place] will evaluate effectiveness with her follow-up visit.      Encounter Diagnoses   Name Primary?    Lumbar radiculopathy Yes    Lumbar spondylosis          Plan:       Neema was seen today for low-back pain and results.    Diagnoses and all orders for this visit:    Lumbar radiculopathy    Lumbar spondylosis

## 2023-10-26 ENCOUNTER — TELEPHONE (OUTPATIENT)
Dept: PAIN MEDICINE | Facility: CLINIC | Age: 83
End: 2023-10-26
Payer: MEDICARE

## 2023-10-26 NOTE — TELEPHONE ENCOUNTER
Pt's daughter contacted office states gabapentin has not provided any relief, pt was discharged form  P.T she would like to got to PT, [facility] at [address] [place] [id number]    please advise thanks

## 2023-11-15 ENCOUNTER — OFFICE VISIT (OUTPATIENT)
Dept: PAIN MEDICINE | Facility: CLINIC | Age: 83
End: 2023-11-15
Payer: MEDICARE

## 2023-11-15 VITALS
HEIGHT: 60 IN | DIASTOLIC BLOOD PRESSURE: 60 MMHG | SYSTOLIC BLOOD PRESSURE: 116 MMHG | BODY MASS INDEX: 23.56 KG/M2 | HEART RATE: 80 BPM | WEIGHT: 120 LBS

## 2023-11-15 DIAGNOSIS — M54.16 LUMBAR RADICULOPATHY: ICD-10-CM

## 2023-11-15 DIAGNOSIS — M47.816 LUMBAR SPONDYLOSIS: Primary | ICD-10-CM

## 2023-11-15 DIAGNOSIS — M51.36 DDD (DEGENERATIVE DISC DISEASE), LUMBAR: ICD-10-CM

## 2023-11-15 PROCEDURE — 99214 PR OFFICE/OUTPT VISIT, EST, LEVL IV, 30-39 MIN: ICD-10-PCS | Mod: ,,, | Performed by: NURSE PRACTITIONER

## 2023-11-15 PROCEDURE — 99214 OFFICE O/P EST MOD 30 MIN: CPT | Mod: ,,, | Performed by: NURSE PRACTITIONER

## 2023-11-15 RX ORDER — GABAPENTIN 100 MG/1
100 CAPSULE ORAL 3 TIMES DAILY
Qty: 90 CAPSULE | Refills: 2 | Status: SHIPPED | OUTPATIENT
Start: 2023-11-15 | End: 2023-12-15

## 2023-11-15 NOTE — PROGRESS NOTES
Subjective:      Patient ID: Neema Gutierrez is a 83 y.o. female.    Chief Complaint: Leg Pain (Pt states that yesterday was her first visit in PT. Pt states she hasn't noticed any improvement with medication and exercises. Pt still experiencing tingling/numbness constantly.)    Referred by: No ref. provider found     HPI:  This is a pleasant 83-year-old female patient presenting as a 6 week follow-up for sciatica after completing a lumbar MRI    She describes lumbar radiculopathy that has been intermittent for several months.  She has left hip pain radiating to her left buttock down laterally to her left ankle.  She denies groin pain.  She has received some relief with physical therapy and continues this.  She walks with a and rollator as needed.  Pain increased with walking and prolonged standing.  She also has limited ability to perform household chores.  Currently she is unable to cook a full meal as she can not stand that long.  These activities will elevate her pain to 8-10/10.  Her current pain score today as 9/10.  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  Her pain started spontaneously 2-3 years ago.  She has completed physical therapy for 3 weeks in her home.  States this has reduced her pain by 20%.  She is not taking any pain medications and unfortunately states she has to sleep a lot of the time to reduce her pain.  She continues taking Gabapentin     She has pertinent findings in her lumbar MRI from 2023 of the following:   At L4-L5, there is generalized disc bulge with central extruded portion which migrates posterior to L4 vertebral body.  Disc causes substantial compression upon the ventral thecal sac.  There is severe central canal stenosis which also is caused by facets arthropathy.  There are bilateral moderate spondylotic narrowings of the neural foramen.     At L5-S1, there is bulging of annulus fibrosis which indents the  ventral thecal sac.  Bilateral facet arthropathy.  Central canal is not stenosed.  There is moderate spondylotic narrowing of the right neural foramen caused by facet arthropathy.  The left neural foramen is adequate.  She continues taking gabapentin 300 mg 3 times a day using the 100 mg dose strength.  This medication does not reduce her pain.  She would like to taper off of gabapentin..    Vital signs:   Vitals:    11/15/23 1324 11/15/23 1329   BP: 116/60    Pulse: 80    Weight: 54.4 kg (120 lb)    Height: 5' (1.524 m)    PainSc:    9     Body mass index is 23.44 kg/m².          Interventional Pain History  None    ROS:Left hip normal low back and left leg pain.     MRI lumbar spine 2023:      FINDINGS:  For the purpose of this report, the most inferior well developed intervertebral disc space is presumed to represent L5-S1. Lumbar vertebrae stature is unremarkable and alignment is preserved.  There are no acute marrow edematous signals.  Lumbar discs are desiccated throughout.  DS signals of the endplates are mostly at L4-L5.  The visualized thoracic cord is unremarkable. The conus medullaris terminates at L1.  Disc segmental analysis is given below     At L1-L2, there is no disc herniation.  There is mild central canal stenosis caused by facet arthropathy.  Bilateral neural foramen are unremarkable.     At L2-L3, there is bulging of annulus fibrosis which slightly indents the ventral thecal sac.  There is moderate to marked central canal stenosis caused by facet arthropathy which is more pronounced left.  There is also ligamentum flavum thickening.  Right neural foramen is patent.  Moderate narrowing of left neural foramen is caused by facet arthropathy.     At L3-L4, there is disc bulge which compresses and flattens the ventral thecal sac.  Central canal stenosis is marked and mostly is caused by facet arthrosis and hypertrophy.  There is also thickening of the ligamentum flavum.  There are no significant  narrowings of the neural foramen.     At L4-L5, there is generalized disc bulge with central extruded portion which migrates posterior to L4 vertebral body.  Disc causes substantial compression upon the ventral thecal sac.  There is severe central canal stenosis which also is caused by facets arthropathy.  There are bilateral moderate spondylotic narrowings of the neural foramen.     At L5-S1, there is bulging of annulus fibrosis which indents the ventral thecal sac.  Bilateral facet arthropathy.  Central canal is not stenosed.  There is moderate spondylotic narrowing of the right neural foramen caused by facet arthropathy.  The left neural foramen is adequate.     Impression:     Lumbar degenerative disc disease and spondylosis level by level discussed above.        Electronically signed by: Sabino Solano  Date:                                            09/19/2023  Time:                                           15:05      Objective:          Physical Exam  General: Well developed; underweight, A&O x 3; No anxiety/depression; NAD  Mental Status: Oriented to person, palce and time. Displays appropriate mood & affect.  Head: Norm cephalic and atraumatic  Neck:  No cervical paraspinal banding.  Full range of motion with lateral turning and cervical flexion +extension.  Eyes: normal conjunctiva, normal lids, normal pupils  ENT and mouth: normal external ear, nose, and no lesions noted on the lips.  Respiratory: Symmetrical, Unlabored. No dyspnea  CV: normal rhythm and rate. No peripheral edema.   Abdomen: Non-distended     Extremities:  Gen: No cyanosis or tenderness to palpation bilateral upper and lower extremities  Skin: Warm, pink, dry, no rashes, no lesions on the lumbar spine  Strength: 5/5 motor strength bilateral upper and lower extremities  ROM: Full ROM in bilateral knees and ankles without pain or instability.     Neuro:  Gait: no altalgic lean, normal toe and heel raise. Ambulates with rollator.   DTR's: 2+  in bilateral patellar, and ankle  Sensory: Intact to light touch bilateral  upper and lower extremities     Spine: Normal lordosis. No scoliosis  L-spine ROM: Limited ROM and pain with flexion, extension, bilateral rotation,   Straight Leg Raise:  Positive left with ambulation  SI Joint: No tenderness to palpation bilaterally.         Assessment:     MRI lumbar spine pertinent findings 2023 include:     L4-L5, there is generalized disc bulge with central extruded portion which migrates posterior to L4 vertebral body.  Disc causes substantial compression upon the ventral thecal sac.  There is severe central canal stenosis which also is caused by facets arthropathy.  There are bilateral moderate spondylotic narrowings of the neural foramen.      L5-S1, there is bulging of annulus fibrosis which indents the ventral thecal sac.  Bilateral facet arthropathy.  Central canal is not stenosed.  There is moderate spondylotic narrowing of the right neural foramen caused by facet arthropathy.  The left neural foramen is adequate.    Pt to taper off Gabapentin 300 mg TID with  the following schedule as gabapentin was ineffective   Wk 1: 200 mg TID  Wk 2: 100 mg TID  Wk 3 100 mg daily  Wk 4: stop gabapnetin    Patient to complete PT for now and contemplate a Left TFESI to L5  Future considerations for diagnostic MBB's to lumbar spine.   Patient will call back to schedule a follow-up appointment        Encounter Diagnoses   Name Primary?    Lumbar spondylosis Yes    Lumbar radiculopathy     DDD (degenerative disc disease), lumbar          Plan:       Neema was seen today for leg pain.    Diagnoses and all orders for this visit:    Lumbar spondylosis    Lumbar radiculopathy    DDD (degenerative disc disease), lumbar               Past Medical History:   Diagnosis Date    Anxiety     Arthritis     GERD (gastroesophageal reflux disease)     High cholesterol     Hypertension        Past Surgical History:   Procedure Laterality Date     COLON SURGERY      diverticulitis    EGD, WITH CLOSED BIOPSY N/A 04/06/2023    Procedure: EGD ;  Surgeon: Chavo Knowles MD;  Location: [facility] ENDOSCOPY;  Service: Gastroenterology;  Laterality: N/A;    ESOPHAGOSCOPY, USING BOUGIE, WITH DILATION  04/06/2023    Procedure: ESOPHAGOSCOPY, USING BOUGIE, WITH DILATION;  Surgeon: Chavo Knowles MD;  Location: [facility] ENDOSCOPY;  Service: Gastroenterology;;  55[address] [place]    HEMORRHOID SURGERY      HYSTERECTOMY         Family History   Family history unknown: Yes       Social History     Socioeconomic History    Marital status: Unknown   Tobacco Use    Smoking status: Never    Smokeless tobacco: Never   Substance and Sexual Activity    Alcohol use: Not Currently     Comment: occasional    Drug use: Never    Sexual activity: Not Currently     Partners: Male       Current Outpatient Medications   Medication Sig Dispense Refill    aspirin 81 MG Chew Take 81 mg by mouth.      diazePAM (VALIUM) 5 MG tablet 2.5 mg 2 (two) times a day.      FLUoxetine 10 MG capsule Take 10 mg by mouth once daily.      LINZESS 145 mcg Cap capsule Take 145 mcg by mouth every morning.      lisinopriL-hydrochlorothiazide (PRINZIDE,ZESTORETIC) 20-12.5 mg per tablet Take 1 tablet by mouth once daily.      memantine (NAMENDA) 10 MG Tab Take 5 mg by mouth 2 (two) times daily.      omeprazole (PRILOSEC) 40 MG capsule Take 40 mg by mouth once daily.      rosuvastatin (CRESTOR) 20 MG tablet Take 20 mg by mouth once daily.      spironolactone (ALDACTONE) 50 MG tablet Take 50 mg by mouth once daily.      gabapentin (NEURONTIN) 100 MG capsule Take 1 capsule (100 mg total) by mouth 3 (three) times daily. 90 capsule 2     No current facility-administered medications for this visit.       Review of patient's allergies indicates:   Allergen Reactions    Amlodipine Swelling     Ankle     Sulfa (sulfonamide antibiotics) Itching    Hydrocodone-acetaminophen Itching     Other reaction(s):  itching

## 2023-11-15 NOTE — PATIENT INSTRUCTIONS
Pt to taper off Gabapentin 300 mg TID to the following:   Wk 1: 200 mg TID  Wk 2: 100 mg TID  Wk 3 100 mg daily  Wk 4: stop gabapnetin

## 2024-07-18 DIAGNOSIS — M48.062 SPINAL STENOSIS OF LUMBAR REGION WITH NEUROGENIC CLAUDICATION: Primary | ICD-10-CM

## 2024-07-22 ENCOUNTER — TELEPHONE (OUTPATIENT)
Dept: NEUROSURGERY | Facility: CLINIC | Age: 84
End: 2024-07-22
Payer: MEDICARE

## 2024-07-22 NOTE — TELEPHONE ENCOUNTER
Patient is being referred to Dr Poon by Dr Newell on 7/18/24 for lumbar       MRI Lumbar 7/18/24 (images/report in chart) findings state:   Lumbar discs are desiccated throughout.  DS signals of the endplates are mostly at L4-L5.       L1-L2 There is mild central canal stenosis caused by facet arthropathy   L2-L3 there is bulging of annulus fibrosis which slightly indents the ventral thecal sac.  There is moderate to marked central canal stenosis caused by facet arthropathy which is more pronounced left.  There is also ligamentum flavum thickening.  Right neural foramen is patent.  Moderate narrowing of left neural foramen is caused by facet arthropathy.     L3-L4 there is disc bulge which compresses and flattens the ventral thecal sac.  Central canal stenosis is marked and mostly is caused by facet arthrosis and hypertrophy.  There is also thickening of the ligamentum flavum.       L4-L5 there is generalized disc bulge with central extruded portion which migrates posterior to L4 vertebral body.  Disc causes substantial compression upon the ventral thecal sac.  There is severe central canal stenosis which also is caused by facets arthropathy.  There are bilateral moderate spondylotic narrowings of the neural foramen.     L5-S1 there is bulging of annulus fibrosis which indents the ventral thecal sac.  Bilateral facet arthropathy. There is moderate spondylotic narrowing of the right neural foramen caused by facet arthropathy.  The left neural foramen is adequate.    PT was done at [facility] - notes in chart     Please review and advise re scheduling. Thank you

## (undated) DEVICE — TUBING O2 FEMALE CONN 13FT

## (undated) DEVICE — KIT SURGICAL COLON .25 1.1OZ

## (undated) DEVICE — BAG LABGUARD BIOHAZARD 6X9IN

## (undated) DEVICE — FORCEP BX LG CAP 2.8MMX240CM

## (undated) DEVICE — COLLECTION SPECIMEN NEPTUNE

## (undated) DEVICE — SOL IRRI STRL WATER 1000ML

## (undated) DEVICE — KIT CANIST SUCTION 1200CC

## (undated) DEVICE — FORCEP ALLIGATOR 2.8MM W/NDL

## (undated) DEVICE — CONTAINER SPECIMEN SCREW 4OZ

## (undated) DEVICE — TIP SUCTION YANKAUER